# Patient Record
Sex: MALE | Race: BLACK OR AFRICAN AMERICAN
[De-identification: names, ages, dates, MRNs, and addresses within clinical notes are randomized per-mention and may not be internally consistent; named-entity substitution may affect disease eponyms.]

---

## 2017-05-08 ENCOUNTER — HOSPITAL ENCOUNTER (EMERGENCY)
Dept: HOSPITAL 62 - ER | Age: 27
Discharge: HOME | End: 2017-05-08
Payer: MEDICAID

## 2017-05-08 VITALS — DIASTOLIC BLOOD PRESSURE: 79 MMHG | SYSTOLIC BLOOD PRESSURE: 106 MMHG

## 2017-05-08 DIAGNOSIS — J45.901: Primary | ICD-10-CM

## 2017-05-08 PROCEDURE — 99284 EMERGENCY DEPT VISIT MOD MDM: CPT

## 2017-05-08 PROCEDURE — 94640 AIRWAY INHALATION TREATMENT: CPT

## 2017-05-08 RX ADMIN — ALBUTEROL SULFATE SCH MG: 2.5 SOLUTION RESPIRATORY (INHALATION) at 02:43

## 2017-05-08 RX ADMIN — ALBUTEROL SULFATE SCH MG: 2.5 SOLUTION RESPIRATORY (INHALATION) at 03:15

## 2017-05-08 NOTE — ER DOCUMENT REPORT
ED General





- General


Chief Complaint: Breathing Difficulty


Stated Complaint: TROUBLE BREATHING


Notes: 


Patient is 27-year-old male presents with complaint of wheezing.  Distant 

history of asthma.  Today started having some wheezing.  He said he had use 

inhaler as a child but has not had use an inhaler much recently.  He does not 

smoke.  Has no fevers.  No recent infections.  No other complaints at this time.


TRAVEL OUTSIDE OF THE [place] IN LAST 30 DAYS: No





- Related Data


Allergies/Adverse Reactions: 


 





No Known Allergies Allergy (Unverified 05/08/17 02:06)


 











Past Medical History





- Social History


Smoking Status: Unknown if Ever Smoked


Frequency of alcohol use: None


Drug Abuse: None


Family History: Reviewed & Not Pertinent


Patient has suicidal ideation: No


Patient has homicidal ideation: No


Renal/ Medical History: Denies: Hx Peritoneal Dialysis





Review of Systems





- Review of Systems


Notes: 


My Normal Review Basic





REVIEW OF SYSTEMS:


CONSTITUTIONAL :  Denies fever,  chills, or sweats.  Denies recent illness.


EENT:   Denies eye, ear, throat, or mouth pain or symptoms.  Denies nasal or 

sinus congestion.


CARDIOVASCULAR:  Denies chest pain.


RESPIRATORY: Cough and congestion.  Wheezing.


GASTROINTESTINAL:  Denies abdominal pain.  Denies nausea, vomiting, or 

diarrhea.  Denies constipation.  Last BM: 


MUSCULOSKELETAL:  Denies neck or back pain or joint pain or swelling.


SKIN:   Denies rash or skin lesions.


NEUROLOGICAL:  Denies altered mental status or loss of consciousness.  Denies 

headache.  Denies weakness or paralysis or loss of use of either side.  Denies 

problems with gait or speech.  Denies sensory or motor loss.


ALL OTHER SYSTEMS REVIEWED AND NEGATIVE.





Physical Exam





- Vital signs


Vitals: 


 











Temp Pulse Resp BP Pulse Ox


 


 98.4 F   63   18   130/76 H  97 


 


 05/08/17 02:05  05/08/17 02:05  05/08/17 02:05  05/08/17 02:05  05/08/17 02:05














- Notes


Notes: 


General Appearance: Well nourished, alert, cooperative, no acute distress, no 

obvious discomfort.


Vitals: reviewed, See vital signs table.


Head: no swelling or tenderness to the head


Eyes: PERRL, EOMI, Conjuctiva clear


Mouth: No decreasd moisture


Neck: Supple, no neck tenderness


Lungs: Diffuse mild wheezing, No rales, No rhonci, No accessory muscle use, 

good air exchange bilaterally.


Heart: Normal rate, Regular rythm, No murmur, no rub


Extremities: strength 5/5 in all extremities, good pulses in all extremities, 

no swelling or tenderness in the extremities, no edema.


Skin: warm, dry, appropriate color, no rash


Neuro: speech clear, oriented x 3, normal affect, responds appropriately to 

questions.





Course





- Vital Signs


Vital signs: 


 











Temp Pulse Resp BP Pulse Ox


 


 98.4 F   63   18   130/76 H  97 


 


 05/08/17 02:05  05/08/17 02:05  05/08/17 02:05  05/08/17 02:05  05/08/17 02:05














- Transfer of Care


Notes: 





05/08/17 03:28


Patient's lung fields sound much improved after the nebulizer treatment.  I 

feel he is safe to be discharged home.  Wheezing is now gone.  We will 

discharge him home with albuterol inhaler.  We'll place him on prednisone for 

next 3 days.  I encourage return to ER if has worsening recurrent wheezing, 

difficulty breathing, or feels unwell.  Patient agrees with plan and will be 

discharged home.





Dictation of this chart was performed using voice recognition software; 

therefore, there may be some unintended grammatical errors.





Discharge





- Discharge


Clinical Impression: 


 Asthma attack





Condition: Good


Disposition: HOME, SELF-CARE


Additional Instructions: 


ASTHMA:





     You have been diagnosed as having asthma.  This is a condition where there 

is episodic tightness in the bronchial tubes.  Allergies, infections, and 

polluted or cold air may be contributing factors.


     Emergency treatment of a severe asthma attack may include adrenaline shots

, or bronchodilator aerosol.  You may feel lightheaded, have a decreased 

exercise tolerance and a rapid pulse for an hour or two.  Rest and get plenty 

of fluids.


     Home treatment of asthma requires bronchodilator drugs.  These can be 

administered by injection, inhalation, or by mouth.  Antibiotics and 

corticosteroids may be required for some patients.  You should avoid chemical 

fumes, dusts, pollens, and exercising in very cold or dry air. If you smoke, 

stop!!


     If you develop a fever, increased wheezing, chest pain, or severe 

shortness of breath, you should contact the doctor immediately.








STEROID MEDICATION:


     You have been given an injection of or oral medicine of the cortisone/

steroid class.  This medication is used to control inflammation or allergy.  

Jose t is usually only given for a short period of time, until the acute process 

subsides.


     There are usually no side effects from short-term use of cortisone-like 

medications.  Some persons feel an increased sense of well-being and are not 

sleepy at bedtime.  Long-term use of cortisone medications is best avoided, 

unless required for a severe condition.  If your condition does not remit, or 

relapses after the course of corticosteroid medication, you should consult your 

physician.








INHALED BRONCHODILATORS:


     You have received treatment(s) of and/or prescription for an inhaled 

bronchodilator -- a medication which stimulates the airways in the lung to 

dilate.  This improves the flow of air in asthma, bronchitis, and emphysema.


     These medicines have some similarity to adrenaline, and can cause similar 

side effects:  shakiness, racing heart, and a sense of nervousness.  These side 

effects decrease with time.  Contact your doctor if these side effects are 

severe.


     Do not over-use the medicine.  Too-frequent use of the inhaler may make it 

ineffective.  Call your doctor if the inhaler is not controlling your symptoms 

at the prescribed doses.











FOLLOW-UP CARE:


If you have been referred to a physician for follow-up care, call the physician

s office for an appointment as you were instructed or within the next two days.

  If you experience worsening or a significant change in your symptoms, notify 

the physician immediately or return to the Emergency Department at any time for 

re-evaluation.








Please use the inhaler as 2 puffs every 4 hours as needed for wheezing.  Please 

return to the ER immediately if you continue to have worsening wheezing, 

recurrent wheezing despite using her inhaler, you have difficulty breathing, or 

if you feel unwell.


Prescriptions: 


Prednisone [Deltasone 20 mg Tablet] 3 tab PO DAILY 3 Days


Forms:  Return to Work

## 2017-06-19 ENCOUNTER — HOSPITAL ENCOUNTER (EMERGENCY)
Dept: HOSPITAL 62 - ER | Age: 27
Discharge: HOME | End: 2017-06-19
Payer: SELF-PAY

## 2017-06-19 VITALS — DIASTOLIC BLOOD PRESSURE: 78 MMHG | SYSTOLIC BLOOD PRESSURE: 139 MMHG

## 2017-06-19 DIAGNOSIS — H10.31: ICD-10-CM

## 2017-06-19 DIAGNOSIS — J45.901: Primary | ICD-10-CM

## 2017-06-19 DIAGNOSIS — R06.02: ICD-10-CM

## 2017-06-19 DIAGNOSIS — R22.0: ICD-10-CM

## 2017-06-19 DIAGNOSIS — Z87.891: ICD-10-CM

## 2017-06-19 PROCEDURE — 99284 EMERGENCY DEPT VISIT MOD MDM: CPT

## 2017-06-19 PROCEDURE — 94640 AIRWAY INHALATION TREATMENT: CPT

## 2017-06-19 NOTE — ER DOCUMENT REPORT
ED General





- General


Chief Complaint: Asthma Exacerbation


Stated Complaint: DIFFICULTY BREATHING/EYE SWELLING


Time Seen by Provider: 06/19/17 07:06


Mode of Arrival: Ambulatory


Information source: Patient


Notes: 


27-year-old male history of asthma presents with complaints of wheezing.  

Patient of symptoms have been ongoing for a few days, denies any fevers or 

chills denies any productive cough.  Patient notes this is similar to his 

previous asthma exacerbation, he was recently on steroids which makes him feel 

much better and requests more steroids.  Patient also notes for the past 2 days 

his right eye has been slightly red and itchy with small amount of drainage


TRAVEL OUTSIDE OF THE [place] IN LAST 30 DAYS: No





- HPI


Onset: Other


Onset/Duration: Persistent


Quality of pain: Burning


Severity: Mild


Pain Level: 1


Associated symptoms: Shortness of breath


Exacerbated by: Denies


Relieved by: Denies


Similar symptoms previously: Yes


Recently seen / treated by doctor: Yes





- Related Data


Allergies/Adverse Reactions: 


 





No Known Allergies Allergy (Unverified 05/08/17 02:06)


 











Past Medical History





- Social History


Smoking Status: Former Smoker


Cigarette use (# per day): No


Chew tobacco use (# tins/day): No


Smoking Education Provided: No


Family History: Reviewed & Not Pertinent


Pulmonary Medical History: Reports: Hx Asthma


Renal/ Medical History: Denies: Hx Peritoneal Dialysis





Review of Systems





- Review of Systems


Notes: 


REVIEW OF SYSTEMS:


CONSTITUTIONAL :  Denies fever,  chills, or sweats.  Denies recent illness.


EENT:   Admits to right eye redness


CARDIOVASCULAR:  Denies chest pain.  Denies palpitations or racing or irregular 

heart beat.  Denies ankle edema.


RESPIRATORY: Admits to shortness breath


GASTROINTESTINAL:  Denies abdominal pain or distention.  Denies nausea, vomiting

, or diarrhea.  Denies blood in vomitus, stools, or per rectum.  Denies black, 

tarry stools.  Denies constipation.  


GENITOURINARY:  Denies difficulty urinating, painful urination, burning, 

frequency, blood in urine, or discharge.


MUSCULOSKELETAL:  Denies back or neck pain or stiffness.  Denies joint pain or 

swelling.


SKIN:   Denies rash, lesions or sores.


HEMATOLOGIC :   Denies easy bruising or bleeding.


LYMPHATIC:  Denies swollen, enlarged glands.


NEUROLOGICAL:  Denies confusion or altered mental status.  Denies passing out 

or loss of consciousness.  Denies dizziness or lightheadedness.  Denies 

headache.  Denies weakness or paralysis or loss of use of either side.  Denies 

problems with gait or speech.  Denies sensory loss, numbness, or tingling.  

Denies seizures.


PSYCHIATRIC:  Denies anxiety or stress.  Denies depression, suicidal ideation, 

or homicidal ideation.





ALL OTHER SYSTEMS REVIEWED AND NEGATIVE.





Dictation was performed using Dragon voice recognition software 





PHYSICAL EXAMINATION:





GENERAL: Well-appearing, well-nourished and in no acute distress.





HEAD: Atraumatic, normocephalic.





EYES: Bilateral mild conjunctivitis noted slightly erythematous





ENT: Nares patent, oropharynx clear without exudates.  Moist mucous membranes.





NECK: Normal range of motion, supple without lymphadenopathy





LUNGS: Inspiratory respiratory wheezing no respiratory distress





HEART: Regular rate and rhythm without murmurs





ABDOMEN: Soft, nontender, nondistended abdomen.  No guarding, no rebound.  No 

masses appreciated.





Musculoskeletal: Normal range of motion, no pitting or edema.  No cyanosis.





NEUROLOGICAL: Cranial nerves grossly intact.  Normal speech, normal gait.  

Normal sensory, motor exams 





PSYCH: Normal mood, normal affect.





SKIN: Warm, Dry, normal turgor, no rashes or lesions noted.





Physical Exam





- Vital signs


Vitals: 


 











Temp Pulse Resp BP Pulse Ox


 


 97.8 F   85   18   136/91 H  95 


 


 06/19/17 04:44  06/19/17 04:44  06/19/17 04:44  06/19/17 04:44  06/19/17 04:44














Course





- Re-evaluation


Re-evalutation: 





06/19/17 14:18


Patient evaluation notes mild asthma exacerbation, patient was given DuoNeb 

which resolved his breathing.  Patient does have conjunctivitis we will treat 

as bacterial











After performing a Medical Screening Examination, I estimate there is LOW risk 

for ACUTE CORONARY SYNDROME, RESPIRATORY FAILURE, SEPSIS OR MENINGITIS, thus I 

consider the discharge disposition reasonable.  I have reevaluated this patient 

multiple times and no significant life threatening changes are noted. The 

patient and I have discussed the diagnosis and risks, and we agree with 

discharging home with close follow-up. We also discussed returning to the 

Emergency Department immediately if new or worsening symptoms occur. We have 

discussed the symptoms which are most concerning (e.g., changing or worsening 

pain, trouble swallowing or breathing, neck stiffness, fever) that necessitate 

immediate return.





After performing a Medical Screening Examination, I estimate there is LOW risk 

for a RETAINED CORNEAL or LID FOREIGN BODY, DEEP SPACE INFECTION (e.g., ORBITAL 

CELLULITIS OR ABSCESS), ACUTE GLAUCOMA, PENETRATING GLOBE INJURY, RETINAL 

DETACHMENT, or MENINGITIS thus I consider the discharge disposition reasonable.

  I have reevaluated this patient multiple times and no significant life 

threatening changes are noted. Also, there is no evidence or peritonitis, sepsis

, or toxicity. The patient and I have discussed the diagnosis and risks, and we 

agree with discharging home with outpatient follow-up with the understanding 

that symptoms and presentations can change. We also discussed returning to the 

Emergency Department immediately if new or worsening symptoms occur. We have 

discussed the symptoms which are most concerning (e.g., changing or worsening 

pain, vision changes, neck stiffness or fever) that necessitate immediate 

return.





- Vital Signs


Vital signs: 


 











Temp Pulse Resp BP Pulse Ox


 


 98.9 F   87   16   139/78 H  93 


 


 06/19/17 08:17  06/19/17 08:17  06/19/17 08:17  06/19/17 08:17  06/19/17 08:17














Discharge





- Discharge


Clinical Impression: 


 Asthma exacerbation





Conjunctivitis


Qualifiers:


 Conjunctivitis type: acute Acute conjunctivitis type: bacterial Laterality: 

right Qualified Code(s): H10.31 - Unspecified acute conjunctivitis, right eye





Condition: Stable


Disposition: HOME, SELF-CARE


Instructions:  Asthma ([facility]), Inhaled Bronchodilators ([facility])


Additional Instructions: 


Follow up with your physician tomorrow for further care or return to the ED 

IMMEDIATELY if symptoms worsen or new concerns occur. If you cannot afford to 

follow up with your primary care physician a list of low cost clinics have been 

provided at the end of your discharge papers as well.


Prescriptions: 


Ciprofloxacin HCl [Ciloxan 0.3% Oph Soln 2.5 ml] 1 drop OU Q6 #1 bottle


Prednisone [Deltasone 20 mg Tablet] 3 tab PO DAILY 5 Days

## 2017-06-24 ENCOUNTER — HOSPITAL ENCOUNTER (EMERGENCY)
Dept: HOSPITAL 62 - ER | Age: 27
Discharge: HOME | End: 2017-06-24
Payer: SELF-PAY

## 2017-06-24 VITALS — DIASTOLIC BLOOD PRESSURE: 81 MMHG | SYSTOLIC BLOOD PRESSURE: 130 MMHG

## 2017-06-24 DIAGNOSIS — H57.11: ICD-10-CM
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
## 2017-06-24 DIAGNOSIS — S05.01XA: Primary | ICD-10-CM

## 2017-06-24 PROCEDURE — 99282 EMERGENCY DEPT VISIT SF MDM: CPT
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
## 2019-03-04 ENCOUNTER — HOSPITAL ENCOUNTER (EMERGENCY)
Dept: HOSPITAL 62 - ER | Age: 29
LOS: 1 days | Discharge: HOME | End: 2019-03-05
Payer: SELF-PAY

## 2019-03-04 DIAGNOSIS — Z88.0: ICD-10-CM

## 2019-03-04 DIAGNOSIS — J45.901: Primary | ICD-10-CM

## 2019-03-04 DIAGNOSIS — Z88.3: ICD-10-CM

## 2019-03-04 PROCEDURE — 94640 AIRWAY INHALATION TREATMENT: CPT

## 2019-03-04 PROCEDURE — 99284 EMERGENCY DEPT VISIT MOD MDM: CPT

## 2019-03-05 VITALS — SYSTOLIC BLOOD PRESSURE: 143 MMHG | DIASTOLIC BLOOD PRESSURE: 81 MMHG

## 2019-03-05 RX ADMIN — ALBUTEROL SULFATE SCH MG: 2.5 SOLUTION RESPIRATORY (INHALATION) at 01:24

## 2019-03-05 RX ADMIN — ALBUTEROL SULFATE SCH MG: 2.5 SOLUTION RESPIRATORY (INHALATION) at 01:45

## 2019-03-05 NOTE — ER DOCUMENT REPORT
ED Respiratory Problem





- General


Chief Complaint: Breathing Difficulty


Stated Complaint: WHEEZING


Time Seen by Provider: 03/05/19 00:52


Mode of Arrival: Ambulatory


Information source: Patient


Notes: 





29 year old male presents to ED for complaint of shortness of breath and asthma 

and ran out of his medications today so he came to the emergency room to get a 

refill on his asthma medicine and to be treated for an asthma exacerbation.  

Patient is alert and oriented, respirations regular but states it is hard for 

him to take a deep breath.  Walks with a even steady gait.  Speaks in full 

sentences.


TRAVEL OUTSIDE OF THE [place] IN LAST 30 DAYS: No





- HPI


Patient complains to provider of: Asthma, Cough, Short of breath


Onset: This morning


Duration: Worse/persistent


Quality of pain: No pain


Severity: None


Pain Level: Denies


Context: Hx asthma


Short of Breath: Moderate


Cough: Nonproductive


Sputum amount: None


At home treatment: Bronchodilators - Out of medication


Associated symptoms: Cough, Short of breath, Wheezing


Similar symptoms previously: Yes


Recently seen / treated by doctor: No





- Related Data


Allergies/Adverse Reactions: 


                                        





amoxicillin Allergy (Verified 03/04/19 23:54)


   


erythromycin base Allergy (Verified 03/04/19 23:54)


   











Past Medical History





- General


Information source: Patient





- Social History


Smoking Status: Former Smoker


Cigarette use (# per day): No


Chew tobacco use (# tins/day): No


Smoking Education Provided: No


Frequency of alcohol use: None


Drug Abuse: None


Occupation: Construction


Lives with: Spouse/Significant other


Family History: Reviewed & Not Pertinent


Patient has suicidal ideation: No


Patient has homicidal ideation: No





- Past Medical History


Cardiac Medical History: Reports: None


Pulmonary Medical History: Reports: Hx Asthma


EENT Medical History: Reports: None


Neurological Medical History: Reports: None


Endocrine Medical History: Reports: None


Renal/ Medical History: Reports: None


Malignancy Medical History: Reports None


GI Medical History: Reports: None


Musculoskeletal Medical History: Reports None


Skin Medical History: Reports None


Psychiatric Medical History: Reports: None


Traumatic Medical History: Reports: None


Infectious Medical History: Reports: None


Surgical Hx: Negative


Past Surgical History: Reports: None





Review of Systems





- Review of Systems


Constitutional: No symptoms reported


EENT: No symptoms reported


Cardiovascular: No symptoms reported


Respiratory: Cough, Short of breath, Wheezing


Gastrointestinal: No symptoms reported


Genitourinary: No symptoms reported


Male Genitourinary: No symptoms reported


Musculoskeletal: No symptoms reported


Skin: No symptoms reported


Hematologic/Lymphatic: No symptoms reported


Neurological/Psychological: No symptoms reported


-: Yes All other systems reviewed and negative





Physical Exam





- Vital signs


Vitals: 


                                        











Temp Pulse Resp BP Pulse Ox


 


 98.4 F   74   16   139/84 H  94 


 


 03/05/19 00:04  03/05/19 00:04  03/05/19 00:04  03/05/19 00:04  03/05/19 00:04











Interpretation: Normal





- General


General appearance: Appears well, Alert





- HEENT


Head: Normocephalic, Atraumatic


Eyes: Normal


Pupils: PERRL


Ears: Normal


External canal: Normal


Tympanic membrane: Normal


Sinus: Normal


Nasal: Normal


Mouth/Lips: Normal


Mucous membranes: Normal


Pharynx: Normal


Neck: Normal





- Respiratory


Respiratory status: No respiratory distress


Chest status: Nontender


Breath sounds: Nonproductive cough, Wheezing


Chest palpation: Normal





- Cardiovascular


Rhythm: Regular


Heart sounds: Normal auscultation


Murmur: No





- Abdominal


Inspection: Normal


Distension: No distension


Bowel sounds: Normal


Tenderness: Nontender


Organomegaly: No organomegaly





- Back


Back: Normal, Nontender





- Extremities


General upper extremity: Normal inspection, Nontender, Normal color, Normal ROM,

Normal temperature


General lower extremity: Normal inspection, Nontender, Normal color, Normal ROM,

Normal temperature, Normal weight bearing.  No: Adalid's sign





- Neurological


Neuro grossly intact: Yes


Cognition: Normal


Orientation: AAOx4


Matt Coma Scale Eye Opening: Spontaneous


Matt Coma Scale Verbal: Oriented


Matt Coma Scale Motor: Obeys Commands


[place] Coma Scale Total: 15


Speech: Normal


Motor strength normal: LUE, RUE, LLE, RLE


Sensory: Normal





- Psychological


Associated symptoms: Normal affect, Normal mood





- Skin


Skin Temperature: Warm


Skin Moisture: Dry


Skin Color: Normal





Course





- Re-evaluation


Re-evalutation: 





03/05/19 02:17


Patient treated with 2 albuterol 1 DuoNeb's and 60 mg of prednisone.  His 

respirations are much clear he is able to breathe much more easily and he was 

discharged home with a prescription for albuterol and prednisone.  He was also 

discharged home with an albuterol inhaler to last until he can get his that 

nebulizer medications.  Patient was instructed to follow-up with primary care.  

Patient verbalized understanding and agreement with treatment plan.





- Vital Signs


Vital signs: 


                                        











Temp Pulse Resp BP Pulse Ox


 


 98.3 F   75   18   130/86 H  96 


 


 03/05/19 01:12  03/05/19 01:12  03/05/19 01:46  03/05/19 01:12  03/05/19 01:12














Discharge





- Discharge


Clinical Impression: 


Asthma exacerbation


Qualifiers:


 Asthma severity: moderate Asthma persistence: unspecified Qualified Code(s): 

J45.901 - Unspecified asthma with (acute) exacerbation





Condition: Stable


Disposition: HOME, SELF-CARE


Instructions:  Family Physicians / Practices


Additional Instructions: 


ASTHMA:





     You have been diagnosed as having asthma.  This is a condition where there 

is episodic tightness in the bronchial tubes.  Allergies, infections, and 

polluted or cold air may be contributing factors.


     Emergency treatment of a severe asthma attack may include adrenaline shots,

or bronchodilator aerosol.  You may feel lightheaded, have a decreased exercise 

tolerance and a rapid pulse for an hour or two.  Rest and get plenty of fluids.


     Home treatment of asthma requires bronchodilator drugs.  These can be 

administered by injection, inhalation, or by mouth.  Antibiotics and 

corticosteroids may be required for some patients.  You should avoid chemical 

fumes, dusts, pollens, and exercising in very cold or dry air. If you smoke, 

stop!!


     If you develop a fever, increased wheezing, chest pain, or severe shortness

of breath, you should contact the doctor immediately.








STEROID MEDICATION:


     You have been given an injection of or oral medicine of the 

cortisone/steroid class.  This medication is used to control inflammation or 

allergy.  Jose t is usually only given for a short period of time, until the 

acute process subsides.


     There are usually no side effects from short-term use of cortisone-like 

medications.  Some persons feel an increased sense of well-being and are not 

sleepy at bedtime.  Long-term use of cortisone medications is best avoided, 

unless required for a severe condition.  If your condition does not remit, or 

relapses after the course of corticosteroid medication, you should consult your 

physician.








INHALED BRONCHODILATORS:


     You have received treatment(s) of and/or prescription for an inhaled 

bronchodilator -- a medication which stimulates the airways in the lung to 

dilate.  This improves the flow of air in asthma, bronchitis, and emphysema.


     These medicines have some similarity to adrenaline, and can cause similar 

side effects:  shakiness, racing heart, and a sense of nervousness.  These side 

effects decrease with time.  Contact your doctor if these side effects are 

severe.


     Do not over-use the medicine.  Too-frequent use of the inhaler may make it 

ineffective.  Call your doctor if the inhaler is not controlling your symptoms 

at the prescribed doses.








SMOKING:


     If you smoke, you should stop smoking.  The tar and chemicals in cigarette 

smoke are harmful.  Smoking has been shown to cause:


          emphysema


          chronic bronchitis


          lung cancer


          mouth and throat cancer


          stomach and pancreas cancer


          premature aging


          birth defects


     In addition, smoking increases ear and lung infections in children of 

smokers.





USE OF ACETAMINOPHEN:


     Acetaminophen may be taken for pain relief or fever control. It's much 

safer than aspirin, offering a wider range of "safe" dosages.  It is safe during

pregnancy.  Some brand names are Tylenol, Panadol, Datril, Anacin 3, Tempra, and

Liquiprin. Acetaminophen can be repeated every four hours.  The following are 

maximum recommended dosages:








FOLLOW-UP CARE:


If you have been referred to a physician for follow-up care, call the 

physicians office for an appointment as you were instructed or within the next 

two days.  If you experience worsening or a significant change in your symptoms,

notify the physician immediately or return to the Emergency Department at any 

time for re-evaluation.


Prescriptions: 


Albuterol Sulfate [Ventolin 0.083% Neb 2.5 mg/3 mL Ampul] 2.5 mg NEB Q4HP PRN 

#20 vial.neb


 PRN Reason: For Wheezing


Prednisone [Deltasone 20 mg Tablet] 3 tab PO DAILY 5 Days  tablet


Forms:  Elevated Blood Pressure, Return to Work, Parent Work Note


Referrals: 


[facility] [Provider Group] - Follow up as needed